# Patient Record
Sex: MALE | Race: WHITE | URBAN - METROPOLITAN AREA
[De-identification: names, ages, dates, MRNs, and addresses within clinical notes are randomized per-mention and may not be internally consistent; named-entity substitution may affect disease eponyms.]

---

## 2022-09-14 ENCOUNTER — OFFICE VISIT (OUTPATIENT)
Dept: URBAN - METROPOLITAN AREA CLINIC 72 | Facility: CLINIC | Age: 42
End: 2022-09-14
Payer: COMMERCIAL

## 2022-09-14 DIAGNOSIS — H25.13 AGE-RELATED NUCLEAR CATARACT, BILATERAL: ICD-10-CM

## 2022-09-14 DIAGNOSIS — E11.9 TYPE 2 DIABETES MELLITUS W/O COMPLICATION: Primary | ICD-10-CM

## 2022-09-14 DIAGNOSIS — H35.033 HYPERTENSIVE RETINOPATHY, BILATERAL: ICD-10-CM

## 2022-09-14 DIAGNOSIS — H52.13 MYOPIA, BILATERAL: ICD-10-CM

## 2022-09-14 PROCEDURE — 92310 CONTACT LENS FITTING OU: CPT

## 2022-09-14 PROCEDURE — 92004 COMPRE OPH EXAM NEW PT 1/>: CPT

## 2022-09-14 ASSESSMENT — VISUAL ACUITY
OS: 20/20
OD: 20/20

## 2022-09-14 ASSESSMENT — INTRAOCULAR PRESSURE
OS: 10
OD: 11

## 2022-09-14 NOTE — IMPRESSION/PLAN
Impression: Myopia, bilateral: H52.13. Plan: Dispensed new glasses Rx today and discussed changes and possible adaptation period. Patient to call if any issues with new glasses occur. CL trials given in clinic today If pt likes new RX ok to order

## 2022-09-14 NOTE — IMPRESSION/PLAN
Impression: Type 2 diabetes mellitus w/o complication: S86.6. Plan: Diabetes type II: no background retinopathy, no signs of neovascularization noted. Discussed ocular and systemic benefits of blood sugar control.

## 2022-09-14 NOTE — IMPRESSION/PLAN
Impression: Hypertensive retinopathy, bilateral: H35.033. Pt does have high blood pressure, pt is on medication and is now stable ? [facility] [employer] Plan: Discussed DX and today's testing with pt. Educated/Stressed to pt on importance of BP control with monitoring, remaining compliant with medication regiment, and continuing follow up care with PCP regularly. Explained to pt that signs of Hypertension, such as AV nicking and tortuosity, will continue to be present but if BP does not stay under control/stable it can make condition worse. Recommend yearly DFE with optos for monitoring Pt voiced understanding

## 2024-08-22 ENCOUNTER — OFFICE VISIT (OUTPATIENT)
Dept: URBAN - METROPOLITAN AREA CLINIC 72 | Facility: CLINIC | Age: 44
End: 2024-08-22
Payer: COMMERCIAL

## 2024-08-22 DIAGNOSIS — H25.13 AGE-RELATED NUCLEAR CATARACT, BILATERAL: ICD-10-CM

## 2024-08-22 DIAGNOSIS — H35.033 HYPERTENSIVE RETINOPATHY, BILATERAL: ICD-10-CM

## 2024-08-22 DIAGNOSIS — E11.3293 TYPE 2 DIAB W MILD NONPRLF DIABETIC RTNOP W/O MACULAR EDEMA, BILATERAL: Primary | ICD-10-CM

## 2024-08-22 PROCEDURE — 92014 COMPRE OPH EXAM EST PT 1/>: CPT | Performed by: OPTOMETRIST

## 2024-08-22 ASSESSMENT — INTRAOCULAR PRESSURE
OD: 16
OS: 17

## 2024-11-11 ENCOUNTER — OFFICE VISIT (OUTPATIENT)
Dept: URBAN - METROPOLITAN AREA CLINIC 71 | Facility: CLINIC | Age: 44
End: 2024-11-11
Payer: COMMERCIAL

## 2024-11-11 DIAGNOSIS — H52.13 MYOPIA, BILATERAL: Primary | ICD-10-CM

## 2024-11-11 PROCEDURE — 92012 INTRM OPH EXAM EST PATIENT: CPT

## 2024-11-11 ASSESSMENT — INTRAOCULAR PRESSURE
OD: 14
OS: 16

## 2024-11-11 ASSESSMENT — VISUAL ACUITY
OD: 20/20
OS: 20/20

## 2025-08-11 ENCOUNTER — OFFICE VISIT (OUTPATIENT)
Dept: URBAN - METROPOLITAN AREA CLINIC 71 | Facility: CLINIC | Age: 45
End: 2025-08-11
Payer: COMMERCIAL

## 2025-08-11 DIAGNOSIS — E11.9 TYPE 2 DIABETES MELLITUS W/O COMPLICATION: Primary | ICD-10-CM

## 2025-08-11 PROCEDURE — 99213 OFFICE O/P EST LOW 20 MIN: CPT

## 2025-08-11 ASSESSMENT — INTRAOCULAR PRESSURE
OD: 15
OS: 16